# Patient Record
Sex: MALE | Race: BLACK OR AFRICAN AMERICAN | NOT HISPANIC OR LATINO | URBAN - NONMETROPOLITAN AREA
[De-identification: names, ages, dates, MRNs, and addresses within clinical notes are randomized per-mention and may not be internally consistent; named-entity substitution may affect disease eponyms.]

---

## 2020-01-07 NOTE — PATIENT DISCUSSION
PT TO CHECK WITH INTERNIST TO SEE IF HE CAN STOP ELIQUIS AND ASA - IF HE CAN WITHOUT SIG RISK HE IS TO STOP IT TODAY.

## 2020-01-09 NOTE — PATIENT DISCUSSION
Recommended VITRECTOMY,MEMBRANE PEEL,  endolaser, SILICONE OIL INJECTION. Discussed benefits, alternatives, and risks of surgery including (but not limited to) cataract (if patient has natural lens), infection, bleeding, redetachment, optic neuropathy, loss of vision, blindness, and loss of eye. Patient understands more than one operation may be needed to repair retinal detachment.

## 2020-01-13 ENCOUNTER — IMPORTED ENCOUNTER (OUTPATIENT)
Dept: URBAN - NONMETROPOLITAN AREA CLINIC 1 | Facility: CLINIC | Age: 60
End: 2020-01-13

## 2020-01-13 PROCEDURE — 92310 CONTACT LENS FITTING OU: CPT

## 2020-01-13 PROCEDURE — 92004 COMPRE OPH EXAM NEW PT 1/>: CPT

## 2020-01-13 PROCEDURE — 92015 DETERMINE REFRACTIVE STATE: CPT

## 2020-01-13 NOTE — PATIENT DISCUSSION
Compound Myopic Astigmatism OU w/Presbyopia-  discussed findings w/patient -  new spectacle Rx issued-  OPD done today will order RGP lens OS only-  monitor for CL dispense when trials arrivePOAG-S-  discussed findings w/patient-  total cupping noted OD 2* posterior staphyloma-  IOPs today are 17 17-  c/d's total cupping OD .8/.9 OS-  need to obtain baseline testing OCT ON and Pach-  will schedule testing after CL fittingCataracts OU-  discussed findings w/patient-  no treatment indicated at this time-  UV protection recommended-  continue to monitor yearly or prnNote: Poor views 2* patient cooperation. Stressed the importance of good views as patient is highly near sighted. Discussed the increased risk for retinal tears and detachments. patient instructed not to rub the eye just dab with the tissue.  Warned of potential risk for abrasions if patient rubs the eye; 's Notes: MR 1/13/2020DFE 1/13/2020

## 2020-01-26 PROBLEM — H52.223: Noted: 2020-01-13

## 2020-01-26 PROBLEM — H52.4: Noted: 2020-01-13

## 2020-01-26 PROBLEM — H25.13: Noted: 2020-01-26

## 2020-01-26 PROBLEM — H52.13: Noted: 2020-01-13

## 2020-01-26 PROBLEM — H40.023: Noted: 2020-01-26

## 2020-11-13 NOTE — PATIENT DISCUSSION
Recommended VITRECTOMY, endolaser, SILICONE OIL REMOVAL, MEMBRANE PEEL . Discussed benefits, alternatives, and risks of surgery including (but not limited to) cataract (if patient has natural lens), infection, bleeding, redetachment, optic neuropathy, loss of vision, blindness, and loss of eye. Patient understands more than one operation may be needed to repair retinal detachment.

## 2021-02-02 NOTE — PATIENT DISCUSSION
TO CONSIDER WITH A TRIAL OF AVASTIN - RECOM EVAL BP DR LYONS/TAVON FIRST. no suicidal ideation/no depression/no anxiety/no insomnia

## 2021-04-15 NOTE — PATIENT DISCUSSION
My findings and recommendations are based on patient's symptoms, eye exam, diagnostic testing, and records. -GI followup  -slow meals

## 2021-11-18 ENCOUNTER — IMPORTED ENCOUNTER (OUTPATIENT)
Dept: URBAN - NONMETROPOLITAN AREA CLINIC 1 | Facility: CLINIC | Age: 61
End: 2021-11-18

## 2021-11-18 PROBLEM — H25.13: Noted: 2021-11-18

## 2021-11-18 PROBLEM — H44.23: Noted: 2021-11-18

## 2021-11-18 PROBLEM — H52.223: Noted: 2021-11-18

## 2021-11-18 PROBLEM — H52.4: Noted: 2021-11-18

## 2021-11-18 PROBLEM — H40.023: Noted: 2021-11-18

## 2021-11-18 PROCEDURE — 92014 COMPRE OPH EXAM EST PT 1/>: CPT

## 2021-11-18 PROCEDURE — 92310 CONTACT LENS FITTING OU: CPT

## 2021-11-18 PROCEDURE — 92015 DETERMINE REFRACTIVE STATE: CPT

## 2021-11-18 NOTE — PATIENT DISCUSSION
Cataract OU-Not yet surgical. -Reviewed symptoms of advancing cataract growth such as glare and halos and decreased vision.-Continue to monitor for now. Pt will notify us if any new symptoms develop. DEGENERATIVE Myopia-Discussed diagnosis with patient. -Explained that people who are myopic are at a higher risk for developing RD/RT and reviewed associated S&S.-Pt to contact our office if symptoms develop. Updated spec Rx given. Recommend lens that will provide comfort as well as protect safety and health of eyes. CL wear-CLs fit and center well.-Stressed that patient should not sleep in CL. -Updated CL Rx given. -CL care and precautions given.; 's Notes: MR 1/13/2020DFE 1/13/2020

## 2022-04-10 ASSESSMENT — VISUAL ACUITY
OS_SC: 20/100
OD_CC: J1
OS_CC: J1
OD_CC: CF 4'
OD_SC: CF4'
OD_CC: 20/100
OD_CC: 20/100
OS_CC: CF 4'
OS_CC: CF 4'
OD_SC: 20/100
OS_CC: 20/100
OS_CC: 20/100
OD_CC: CF 4'
OS_SC: 20/70

## 2022-04-10 ASSESSMENT — TONOMETRY: OS_IOP_MMHG: 18

## 2022-11-10 NOTE — PATIENT DISCUSSION
GLAUCOMATOUS CUP , COULD BE DUE TO MULTIPLE SURGERIES, 11/10/22 WILL START LATANOPROST 0.005 QHS TO KEEP IOP AS LOW AS POSSIBLE.

## 2024-07-16 ENCOUNTER — EMERGENCY VISIT (OUTPATIENT)
Dept: RURAL CLINIC 1 | Facility: CLINIC | Age: 64
End: 2024-07-16

## 2024-07-16 DIAGNOSIS — H16.042: ICD-10-CM

## 2024-07-16 DIAGNOSIS — H25.13: ICD-10-CM

## 2024-07-16 DIAGNOSIS — H40.013: ICD-10-CM

## 2024-07-16 PROCEDURE — 99214 OFFICE O/P EST MOD 30 MIN: CPT

## 2024-07-16 ASSESSMENT — VISUAL ACUITY: OS_SC: 20/200-1

## 2024-08-09 ENCOUNTER — FOLLOW UP (OUTPATIENT)
Dept: RURAL CLINIC 1 | Facility: CLINIC | Age: 64
End: 2024-08-09

## 2024-08-09 DIAGNOSIS — H25.13: ICD-10-CM

## 2024-08-09 DIAGNOSIS — H40.013: ICD-10-CM

## 2024-08-09 DIAGNOSIS — H16.042: ICD-10-CM

## 2024-08-09 PROCEDURE — 99213 OFFICE O/P EST LOW 20 MIN: CPT

## 2024-08-09 ASSESSMENT — VISUAL ACUITY: OS_SC: 20/70-1

## 2025-01-28 ENCOUNTER — FOLLOW UP (OUTPATIENT)
Age: 65
End: 2025-01-28

## 2025-01-28 DIAGNOSIS — H25.13: ICD-10-CM

## 2025-01-28 DIAGNOSIS — H40.013: ICD-10-CM

## 2025-01-28 DIAGNOSIS — H44.23: ICD-10-CM

## 2025-01-28 PROCEDURE — 92014 COMPRE OPH EXAM EST PT 1/>: CPT

## 2025-08-05 ENCOUNTER — FOLLOW UP (OUTPATIENT)
Age: 65
End: 2025-08-05

## 2025-08-05 DIAGNOSIS — H40.013: ICD-10-CM

## 2025-08-05 DIAGNOSIS — H25.13: ICD-10-CM

## 2025-08-05 DIAGNOSIS — H44.23: ICD-10-CM

## 2025-08-05 PROCEDURE — 99214 OFFICE O/P EST MOD 30 MIN: CPT

## 2025-08-05 PROCEDURE — 92083 EXTENDED VISUAL FIELD XM: CPT
